# Patient Record
(demographics unavailable — no encounter records)

---

## 2025-01-31 NOTE — HISTORY OF PRESENT ILLNESS
[FreeTextEntry1] : Patient is a pleasant 42 F who presents for a routine post-op check and is s/p bilateral breast implant removal, capsulectomies, mastopexy on 1/29/25 with Dr. Prescott in [place].  She is doing well post-operatively.  She showed me her drain sheets.  Pain is appropriate.  No new complaints.

## 2025-01-31 NOTE — PHYSICAL EXAM
[TextEntry] : Physical Exam General: No acute distress, well-appearing Neuro: Alert and oriented x3, no focal deficits noted Psych: Appropriate mood and affect HEENT: Normocephalic, atraumatic Respiratory: Breathing comfortably on room air, normal effort and expansion Cardio: Regular rate by radial pulse, no cyanosis Extremities: Warm well-perfused, moving all     Breast: Bilateral breasts are soft, SS output in the drains bilaterally which were stripped. Nipples pink with hyperemia, no significant congestion or bleeding.  No evidence of infection or fluid collections, incisions are all C/D/I.  DMSO applied to nipples with xeroform, prineo is in place on vertical limb, xeroform placed on IMF  All breast exams are performed with a female chaperone present

## 2025-01-31 NOTE — REVIEW OF SYSTEMS
[TextEntry] : A 10 point review of systems was conducted and was found to be negative except what was noted in the HPI.

## 2025-01-31 NOTE — PLAN
[TextEntry] : Routine healing.  Recommended topical DMSO for nipple hyperemia once daily for the next week.  Can be applied after showering.  Xeroform or equivalent and pads.  Kerlix removed.  Continue drain care and recording.  Activity restrictions discussed.  [facility] Dr. Prescott next week.  Call with any questions or concerns.

## 2025-03-06 NOTE — HISTORY OF PRESENT ILLNESS
[FreeTextEntry1] : 41 yo pt here for annual exam had br implants  (16 yo implants) removed 5 weeks ago, declines br exam sore from surgery lmp a year ago in feb.  brain fog, bloating prefers to avoid hrt at present, might be open to in future.  wants hormone testing.  occ mild hot flash